# Patient Record
Sex: MALE | Race: WHITE | Employment: FULL TIME | URBAN - METROPOLITAN AREA
[De-identification: names, ages, dates, MRNs, and addresses within clinical notes are randomized per-mention and may not be internally consistent; named-entity substitution may affect disease eponyms.]

---

## 2017-04-20 ENCOUNTER — ONCOLOGY VISIT (OUTPATIENT)
Dept: ONCOLOGY | Facility: CLINIC | Age: 47
End: 2017-04-20
Attending: INTERNAL MEDICINE
Payer: COMMERCIAL

## 2017-04-20 ENCOUNTER — HOSPITAL ENCOUNTER (OUTPATIENT)
Facility: CLINIC | Age: 47
Setting detail: SPECIMEN
Discharge: HOME | End: 2017-04-20
Attending: INTERNAL MEDICINE | Admitting: INTERNAL MEDICINE
Payer: COMMERCIAL

## 2017-04-20 VITALS
TEMPERATURE: 97.6 F | OXYGEN SATURATION: 96 % | HEART RATE: 64 BPM | SYSTOLIC BLOOD PRESSURE: 141 MMHG | BODY MASS INDEX: 44.06 KG/M2 | WEIGHT: 315 LBS | RESPIRATION RATE: 17 BRPM | DIASTOLIC BLOOD PRESSURE: 89 MMHG

## 2017-04-20 DIAGNOSIS — C21.1 MALIGNANT NEOPLASM OF ANAL CANAL (H): Primary | ICD-10-CM

## 2017-04-20 LAB
ALBUMIN SERPL-MCNC: 3.9 G/DL (ref 3.4–5)
ALP SERPL-CCNC: 75 U/L (ref 40–150)
ALT SERPL W P-5'-P-CCNC: 62 U/L (ref 0–70)
ANION GAP SERPL CALCULATED.3IONS-SCNC: 8 MMOL/L (ref 3–14)
AST SERPL W P-5'-P-CCNC: 47 U/L (ref 0–45)
BASOPHILS # BLD AUTO: 0.1 10E9/L (ref 0–0.2)
BASOPHILS NFR BLD AUTO: 1 %
BILIRUB SERPL-MCNC: 0.8 MG/DL (ref 0.2–1.3)
BUN SERPL-MCNC: 9 MG/DL (ref 7–30)
CALCIUM SERPL-MCNC: 7.9 MG/DL (ref 8.5–10.1)
CEA SERPL-MCNC: NORMAL UG/L (ref 0–2.5)
CHLORIDE SERPL-SCNC: 106 MMOL/L (ref 94–109)
CO2 SERPL-SCNC: 26 MMOL/L (ref 20–32)
CREAT SERPL-MCNC: 0.8 MG/DL (ref 0.66–1.25)
DIFFERENTIAL METHOD BLD: NORMAL
EOSINOPHIL # BLD AUTO: 0.2 10E9/L (ref 0–0.7)
EOSINOPHIL NFR BLD AUTO: 3.1 %
ERYTHROCYTE [DISTWIDTH] IN BLOOD BY AUTOMATED COUNT: 13.4 % (ref 10–15)
GFR SERPL CREATININE-BSD FRML MDRD: ABNORMAL ML/MIN/1.7M2
GLUCOSE SERPL-MCNC: 98 MG/DL (ref 70–99)
HCT VFR BLD AUTO: 44.7 % (ref 40–53)
HGB BLD-MCNC: 15.3 G/DL (ref 13.3–17.7)
IMM GRANULOCYTES # BLD: 0 10E9/L (ref 0–0.4)
IMM GRANULOCYTES NFR BLD: 0.3 %
LYMPHOCYTES # BLD AUTO: 1.2 10E9/L (ref 0.8–5.3)
LYMPHOCYTES NFR BLD AUTO: 17.1 %
MCH RBC QN AUTO: 31 PG (ref 26.5–33)
MCHC RBC AUTO-ENTMCNC: 34.2 G/DL (ref 31.5–36.5)
MCV RBC AUTO: 91 FL (ref 78–100)
MONOCYTES # BLD AUTO: 0.6 10E9/L (ref 0–1.3)
MONOCYTES NFR BLD AUTO: 8.4 %
NEUTROPHILS # BLD AUTO: 4.8 10E9/L (ref 1.6–8.3)
NEUTROPHILS NFR BLD AUTO: 70.1 %
NRBC # BLD AUTO: 0 10*3/UL
NRBC BLD AUTO-RTO: 0 /100
PLATELET # BLD AUTO: 174 10E9/L (ref 150–450)
POTASSIUM SERPL-SCNC: 3.7 MMOL/L (ref 3.4–5.3)
PROT SERPL-MCNC: 7.3 G/DL (ref 6.8–8.8)
RBC # BLD AUTO: 4.93 10E12/L (ref 4.4–5.9)
SODIUM SERPL-SCNC: 140 MMOL/L (ref 133–144)
WBC # BLD AUTO: 6.8 10E9/L (ref 4–11)

## 2017-04-20 PROCEDURE — 36415 COLL VENOUS BLD VENIPUNCTURE: CPT

## 2017-04-20 PROCEDURE — 82378 CARCINOEMBRYONIC ANTIGEN: CPT | Performed by: INTERNAL MEDICINE

## 2017-04-20 PROCEDURE — 99211 OFF/OP EST MAY X REQ PHY/QHP: CPT

## 2017-04-20 PROCEDURE — 80053 COMPREHEN METABOLIC PANEL: CPT | Performed by: INTERNAL MEDICINE

## 2017-04-20 PROCEDURE — 85025 COMPLETE CBC W/AUTO DIFF WBC: CPT | Performed by: INTERNAL MEDICINE

## 2017-04-20 PROCEDURE — 99214 OFFICE O/P EST MOD 30 MIN: CPT | Performed by: INTERNAL MEDICINE

## 2017-04-20 ASSESSMENT — PAIN SCALES - GENERAL: PAINLEVEL: NO PAIN (0)

## 2017-04-20 NOTE — PATIENT INSTRUCTIONS
-labs today  -will call you with results  -return to clinic in 1 year with labs a few days prior Scheduled  Chelsea BERGERON given to patient

## 2017-04-20 NOTE — PROGRESS NOTES
"Rex Vieyra is a 46 year old male who presents for:  Chief Complaint   Patient presents with     Oncology Clinic Visit     Malignant neoplasm of anal canal        Initial Vitals:  /89  Pulse 64  Temp 97.6  F (36.4  C) (Oral)  Resp 17  Wt (!) 164.2 kg (362 lb)  SpO2 96%  BMI 44.06 kg/m2 Estimated body mass index is 44.06 kg/(m^2) as calculated from the following:    Height as of 11/5/15: 1.93 m (6' 4\").    Weight as of this encounter: 164.2 kg (362 lb).. Body surface area is 2.97 meters squared. BP completed using cuff size: large  No Pain (0) No LMP for male patient. Allergies and medications reviewed.     Medications: Medication refills not needed today.  Pharmacy name entered into [employer]: [facility] [address] [address] [facility] [address] [facility] [address]    Comments: None    6 minutes for nursing intake (face to face time)   Carrie Ferrer CMA        DISCHARGE PLAN:  Next appointments: See patient instruction section  Departure Mode: Ambulatory  Accompanied by: wife  0 minutes for nursing discharge (face to face time)   Carrie Ferrer CMA      "

## 2017-04-20 NOTE — PROGRESS NOTES
[facility]    Hematology/Oncology Established Patient Follow-up Note      Today's Date: 04/20/2017    Reason for Follow-up: History of treated squamous cell carcinoma of the anus, on surveillance      HISTORY OF PRESENT ILLNESS: Rex Vieyra is a 46 year old male, previously seen by Dr. 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 He underwent concurrent chemoradiation using 5-FU and mitomycin, ending in 06/2011. He has been followed without evidence of recurrence so far. There was PET-positivity in the anal canal in the past in 06/2012, demonstrating an SUV of 9, previously 6.7. Biopsies were done in 07/2012 and were negative. Dr. Neri last saw him on 03/29/2013, and she thought he did not need an anal ultrasound in follow-up, as they were very painful in the past, and wanted to see him back in six months, but he didn't see her again.  He last saw Dr. Acevedo back in May 2014, and recommended to have CT scans for surveillance in 6 months, but he never got those done and subsequently got lost to follow-up.    He re-established care here again in November 2015.        INTERIM HISTORY:  Rasheed comes in for follow-up today.  He has not been seen since November 2015.  He says that he has been doing fine.  He denies any new complaints.  He denies rectal pain, abdominal pain, bowel changes, new lumps/bumps.  Of note, his wife, Cari, sees Dr. Acevedo at [facility] for colon cancer.        REVIEW OF SYSTEMS:   14 point ROS was reviewed and is negative other than as noted above in HPI.     HOME MEDICATIONS:  Current Outpatient Prescriptions   Medication Sig Dispense Refill     omeprazole 20 MG tablet Take 1 tablet (20 mg) by mouth daily Take 30-60 minutes before a meal. 90 tablet 3     Furosemide (LASIX) 20 MG tablet Take 20 mg by mouth daily Reported on 4/20/2017       allopurinol (ZYLOPRIM)  300 MG tablet Take 300 mg by mouth daily Reported on 2017       indomethacin (INDOCIN) 50 MG capsule Take 50 mg by mouth as needed Reported on 2017           ALLERGIES:  No Known Allergies      PAST MEDICAL HISTORY:  Past Medical History:   Diagnosis Date     Gastro-oesophageal reflux disease      Genital warts      Gout      Sleep apnea     Uses C-PAP at [facility]     Squamous cell carcinoma (H)     OF ANUS- RESECTED         PAST SURGICAL HISTORY:  Past Surgical History:   Procedure Laterality Date     anal warts excision       BIOPSY RECTUM  10/13/2011    Procedure:BIOPSY RECTUM; Surgeon:JAGJIT NERI; Location:[facility] OR     BIOPSY RECTUM  2012    Procedure: BIOPSY RECTUM;  Rectal Exam Under Anesthesia, Daniel Cut Anal Biopsy;  Surgeon: Jagjit Neri MD;  Location: [facility]     EXAM UNDER ANESTHESIA RECTUM  10/13/2011    Procedure:EXAM UNDER ANESTHESIA RECTUM; Rectal Exam Under Anesthesia with Rectal Biopsy; Surgeon:JAGJIT NERI; Location:[facility] OR     ORTHOPEDIC SURGERY      left elbow pinning         SOCIAL HISTORY:  Social History     Social History     Marital status: Single     Spouse name: Cari significant other -     Number of children: 0     Years of education: N/A     Occupational History           Social History Main Topics     Smoking status: Former Smoker     Packs/day: 1.00     Years: 22.00     Types: Cigarettes     Quit date: 2008     Smokeless tobacco: Never Used     Alcohol use 4.0 oz/week     8 Standard drinks or equivalent per week      Comment: occassionally 2-6 drinks per week     Drug use: No     Sexual activity: Not on file     Other Topics Concern     Not on file     Social History Narrative         FAMILY HISTORY:  Family History   Problem Relation Age of Onset     CANCER Maternal Grandfather       of lung cancer         PHYSICAL EXAM:  Vital signs:  /89  Pulse 64  Temp 97.6  F (36.4  C) (Oral)  Resp 17  Wt (!) 164.2 kg (362 lb)  SpO2 96%   BMI 44.06 kg/m2   ECO  GENERAL/CONSTITUTIONAL: No acute distress. Accompanied by wife.  EYES: No scleral icterus.  LYMPH: No anterior cervical, posterior cervical, supraclavicular, axillary or inguinal adenopathy.   RESPIRATORY: Clear to auscultation bilaterally. No crackles or wheezing.   CARDIOVASCULAR: Regular rate and rhythm without murmurs, gallops, or rubs.  GASTROINTESTINAL: No tenderness. The patient has normal bowel sounds. No guarding.  MUSCULOSKELETAL: Warm and well-perfused.  NEUROLOGIC: Alert, oriented, answers questions appropriately.  INTEGUMENTARY: No jaundice.  GAIT: Steady, does not use assistive device      LABS:  CBC RESULTS:   Recent Labs   Lab Test  17   1515   WBC  6.8   RBC  4.93   HGB  15.3   HCT  44.7   MCV  91   MCH  31.0   MCHC  34.2   RDW  13.4   PLT  174     Recent Labs   Lab Test  17   1515  14   1604   NA  140  141   POTASSIUM  3.7  3.6   CHLORIDE  106  102   CO2  26  24   ANIONGAP  8  14   GLC  98  108*   BUN  9  16   CR  0.80  0.93   KIP  7.9*  8.6     Lab Results   Component Value Date    AST 47 2017     Lab Results   Component Value Date    ALT 62 2017     No results found for: BILICONJ   Lab Results   Component Value Date    BILITOTAL 0.8 2017     Lab Results   Component Value Date    ALBUMIN 3.9 2017     Lab Results   Component Value Date    PROTTOTAL 7.3 2017      Lab Results   Component Value Date    ALKPHOS 75 2017     CEA is pending.      IMAGING:  None recently.    Last CT c/a/p was on 11/06/15:  1. Mildly enlarged bilateral external iliac chain and inguinal lymph  nodes are unchanged since 2014.  2. A 0.4 cm right pulmonary nodule is unchanged for greater than two  years, and is therefore highly likely to be of benign etiology.  3. Diffuse fatty infiltration of the liver.      ASSESSMENT/PLAN:  Rex Vieyra is a 46 year old male:    1) Squamous cell carcinoma of the anus: s/p resection and chemoradiation  completed in June 2011.  He has been on surveillance since then without evidence of recurrence.    -now that he is 5 years out from completion of treatment, will stop doing routine imaging unless he develops concerning symptoms  -will obtain labs, including CEA today  -if labs unremarkable, RTC in 1 year with labs    I spent a total of 25 minutes with the patient, with over >50% of the time in counseling and/or coordination of care.       Chio Rubio MD  Hematology/Oncology  [facility]

## 2017-04-20 NOTE — MR AVS SNAPSHOT
After Visit Summary   4/20/2017    Rex Vieyra    MRN: [id number]           Patient Information     Date Of Birth          1970        Visit Information        Provider Department      4/20/2017 2:45 PM Chio Rubio MD [facility]      Today's Diagnoses     Malignant neoplasm of anal canal    -  1      Care Instructions    -labs today  -will call you with results  -return to clinic in 1 year with labs a few days prior Scheduled  Chelsea ROSARIOS given to patient          Follow-ups after your visit        Your next 10 appointments already scheduled     Apr 16, 2018  8:00 AM CDT   Return Visit with  ONCOLOGY NURSE   [facility] ([facility])    [facility]  [address] [place] [address] [address]  [facility] [id number] [phone]            Apr 19, 2018  3:45 PM CDT   Return Visit with Chio Rubio MD   [facility] ([facility])    [facility]  [address]  [facility] [address] [phone]              Future tests that were ordered for you today     Open Future Orders        Priority Expected Expires Ordered    Comprehensive metabolic panel Routine 4/20/2018 4/20/2018 4/20/2017    CEA Routine 4/20/2018 4/20/2018 4/20/2017    CBC with platelets differential Routine 4/20/2018 4/20/2018 4/20/2017            Who to contact     If you have questions or need follow up information about today's clinic visit or your schedule please contact [facility] directly at [phone].  Normal or non-critical lab and imaging results will be communicated to you by MyChart, letter or phone within 4 business days after the clinic has received the results. If you do not hear from us within 7 days, please contact the clinic through MyChart or phone. If you have a critical or abnormal lab result, we  "will notify you by phone as soon as possible.  Submit refill requests through [employer] or call your pharmacy and they will forward the refill request to us. Please allow 3 business days for your refill to be completed.          Additional Information About Your Visit        [facility] [employer] Information     [employer] lets you send messages to your doctor, view your test results, renew your prescriptions, schedule appointments and more. To sign up, go to [email or web address] . Click on \"Log in\" on the left side of the screen, which will take you to the Welcome page. Then click on \"Sign up Now\" on the right side of the page.     You will be asked to enter the access code listed below, as well as some personal information. Please follow the directions to create your username and password.     Your access code is: [email or web address]  Expires: 2017  3:22 PM     Your access code will  in 90 days. If you need help or a new code, please call your [place] clinic or [phone].        Care EveryWhere ID     This is your Care EveryWhere ID. This could be used by other organizations to access your [place] medical records  [id number]        Your Vitals Were     Pulse Temperature Respirations Pulse Oximetry BMI (Body Mass Index)       64 97.6  F (36.4  C) (Oral) 17 96% 44.06 kg/m2        Blood Pressure from Last 3 Encounters:   17 141/89   11/05/15 140/81   14 140/82    Weight from Last 3 Encounters:   17 (!) 164.2 kg (362 lb)   11/05/15 (!) 152.9 kg (337 lb)   14 (!) 153.3 kg (338 lb)              We Performed the Following     CBC with platelets differential     CEA     Comprehensive metabolic panel        Primary Care Provider Office Phone # Fax #    Lizz Acevedo [phone]       [address] [address]        Thank you!     Thank you for choosing [facility]  for your care. Our goal is always to provide you with " excellent care. Hearing back from our patients is one way we can continue to improve our services. Please take a few minutes to complete the written survey that you may receive in the mail after your visit with us. Thank you!             Your Updated Medication List - Protect others around you: Learn how to safely use, store and throw away your medicines at www.disposemymeds.org.          This list is accurate as of: 4/20/17  3:22 PM.  Always use your most recent med list.                   Brand Name Dispense Instructions for use    allopurinol 300 MG tablet    ZYLOPRIM     Take 300 mg by mouth daily Reported on 4/20/2017       furosemide 20 MG tablet    LASIX     Take 20 mg by mouth daily Reported on 4/20/2017       indomethacin 50 MG capsule    INDOCIN     Take 50 mg by mouth as needed Reported on 4/20/2017       omeprazole 20 MG tablet     90 tablet    Take 1 tablet (20 mg) by mouth daily Take 30-60 minutes before a meal.

## 2017-04-28 ENCOUNTER — TELEPHONE (OUTPATIENT)
Dept: ONCOLOGY | Facility: CLINIC | Age: 47
End: 2017-04-28

## 2018-05-17 ENCOUNTER — ONCOLOGY VISIT (OUTPATIENT)
Dept: ONCOLOGY | Facility: CLINIC | Age: 48
End: 2018-05-17
Attending: INTERNAL MEDICINE
Payer: COMMERCIAL

## 2018-05-17 ENCOUNTER — HOSPITAL ENCOUNTER (OUTPATIENT)
Facility: CLINIC | Age: 48
Setting detail: SPECIMEN
Discharge: HOME | End: 2018-05-17
Attending: INTERNAL MEDICINE | Admitting: INTERNAL MEDICINE
Payer: COMMERCIAL

## 2018-05-17 VITALS
WEIGHT: 315 LBS | BODY MASS INDEX: 38.36 KG/M2 | HEART RATE: 70 BPM | TEMPERATURE: 99.2 F | OXYGEN SATURATION: 92 % | HEIGHT: 76 IN | RESPIRATION RATE: 18 BRPM | DIASTOLIC BLOOD PRESSURE: 98 MMHG | SYSTOLIC BLOOD PRESSURE: 167 MMHG

## 2018-05-17 DIAGNOSIS — N60.81: Primary | ICD-10-CM

## 2018-05-17 DIAGNOSIS — C21.1 MALIGNANT NEOPLASM OF ANAL CANAL (H): ICD-10-CM

## 2018-05-17 LAB
ALBUMIN SERPL-MCNC: 3.9 G/DL (ref 3.4–5)
ALP SERPL-CCNC: 91 U/L (ref 40–150)
ALT SERPL W P-5'-P-CCNC: 86 U/L (ref 0–70)
ANION GAP SERPL CALCULATED.3IONS-SCNC: 6 MMOL/L (ref 3–14)
AST SERPL W P-5'-P-CCNC: 60 U/L (ref 0–45)
BASOPHILS # BLD AUTO: 0.1 10E9/L (ref 0–0.2)
BASOPHILS NFR BLD AUTO: 1.1 %
BILIRUB SERPL-MCNC: 0.8 MG/DL (ref 0.2–1.3)
BUN SERPL-MCNC: 14 MG/DL (ref 7–30)
CALCIUM SERPL-MCNC: 8.4 MG/DL (ref 8.5–10.1)
CEA SERPL-MCNC: <0.5 UG/L (ref 0–2.5)
CHLORIDE SERPL-SCNC: 105 MMOL/L (ref 94–109)
CO2 SERPL-SCNC: 28 MMOL/L (ref 20–32)
CREAT SERPL-MCNC: 0.82 MG/DL (ref 0.66–1.25)
DIFFERENTIAL METHOD BLD: NORMAL
EOSINOPHIL # BLD AUTO: 0.2 10E9/L (ref 0–0.7)
EOSINOPHIL NFR BLD AUTO: 2.7 %
ERYTHROCYTE [DISTWIDTH] IN BLOOD BY AUTOMATED COUNT: 13.2 % (ref 10–15)
GFR SERPL CREATININE-BSD FRML MDRD: >90 ML/MIN/1.7M2
GLUCOSE SERPL-MCNC: 101 MG/DL (ref 70–99)
HCT VFR BLD AUTO: 46 % (ref 40–53)
HGB BLD-MCNC: 15.7 G/DL (ref 13.3–17.7)
IMM GRANULOCYTES # BLD: 0 10E9/L (ref 0–0.4)
IMM GRANULOCYTES NFR BLD: 0.3 %
LYMPHOCYTES # BLD AUTO: 1.5 10E9/L (ref 0.8–5.3)
LYMPHOCYTES NFR BLD AUTO: 19.3 %
MCH RBC QN AUTO: 31.5 PG (ref 26.5–33)
MCHC RBC AUTO-ENTMCNC: 34.1 G/DL (ref 31.5–36.5)
MCV RBC AUTO: 92 FL (ref 78–100)
MONOCYTES # BLD AUTO: 0.7 10E9/L (ref 0–1.3)
MONOCYTES NFR BLD AUTO: 9 %
NEUTROPHILS # BLD AUTO: 5.1 10E9/L (ref 1.6–8.3)
NEUTROPHILS NFR BLD AUTO: 67.6 %
NRBC # BLD AUTO: 0 10*3/UL
NRBC BLD AUTO-RTO: 0 /100
PLATELET # BLD AUTO: 179 10E9/L (ref 150–450)
POTASSIUM SERPL-SCNC: 3.6 MMOL/L (ref 3.4–5.3)
PROT SERPL-MCNC: 7.7 G/DL (ref 6.8–8.8)
RBC # BLD AUTO: 4.98 10E12/L (ref 4.4–5.9)
SODIUM SERPL-SCNC: 139 MMOL/L (ref 133–144)
WBC # BLD AUTO: 7.5 10E9/L (ref 4–11)

## 2018-05-17 PROCEDURE — 82378 CARCINOEMBRYONIC ANTIGEN: CPT | Performed by: INTERNAL MEDICINE

## 2018-05-17 PROCEDURE — 85025 COMPLETE CBC W/AUTO DIFF WBC: CPT | Performed by: INTERNAL MEDICINE

## 2018-05-17 PROCEDURE — 99214 OFFICE O/P EST MOD 30 MIN: CPT | Performed by: INTERNAL MEDICINE

## 2018-05-17 PROCEDURE — G0463 HOSPITAL OUTPT CLINIC VISIT: HCPCS

## 2018-05-17 PROCEDURE — 80053 COMPREHEN METABOLIC PANEL: CPT | Performed by: INTERNAL MEDICINE

## 2018-05-17 ASSESSMENT — PAIN SCALES - GENERAL: PAINLEVEL: NO PAIN (0)

## 2018-05-17 NOTE — PATIENT INSTRUCTIONS
-schedule right sided mammogram and ultrasound next week  -return to clinic in 1 year with labs all scheduled [place]

## 2018-05-17 NOTE — PROGRESS NOTES
[facility]    Hematology/Oncology Established Patient Follow-up Note      Today's Date: 05/17/2018    Reason for Follow-up: History of treated squamous cell carcinoma of the anus, on surveillance      HISTORY OF PRESENT ILLNESS: Rex Vieyra is a 47 year old male, previously seen by Dr. 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 He underwent concurrent chemoradiation using 5-FU and mitomycin, ending in 06/2011. He has been followed without evidence of recurrence so far. There was PET-positivity in the anal canal in the past in 06/2012, demonstrating an SUV of 9, previously 6.7. Biopsies were done in 07/2012 and were negative. Dr. Neri last saw him on 03/29/2013, and she thought he did not need an anal ultrasound in follow-up, as they were very painful in the past, and wanted to see him back in six months, but he didn't see her again.  He last saw Dr. Acevedo back in May 2014, and recommended to have CT scans for surveillance in 6 months, but he never got those done and subsequently got lost to follow-up.    He re-established care here again in November 2015.      Of note, his wife, Cari, sees Dr. Acevedo at [facility] for colon cancer.        INTERIM HISTORY:  Rasheed comes in for follow-up today.  He says that he has been feeling fine.  He denies rectal bleeding or pain.  Bowel movements are normal.  He notes that his wife noted that his right breast feels abnormal.  There is not a lump or mass there, but it felt firmer.  He denies pain.        REVIEW OF SYSTEMS:   14 point ROS was reviewed and is negative other than as noted above in HPI.     HOME MEDICATIONS:  Current Outpatient Prescriptions   Medication Sig Dispense Refill     allopurinol (ZYLOPRIM) 300 MG tablet Take 300 mg by mouth daily Reported on 4/20/2017       omeprazole 20 MG tablet Take 1 tablet (20 mg) by mouth daily  Take 30-60 minutes before a meal. 90 tablet 3     Furosemide (LASIX) 20 MG tablet Take 20 mg by mouth daily Reported on 2017       indomethacin (INDOCIN) 50 MG capsule Take 50 mg by mouth as needed Reported on 2017           ALLERGIES:  No Known Allergies      PAST MEDICAL HISTORY:  Past Medical History:   Diagnosis Date     Gastro-oesophageal reflux disease      Genital warts 2009     Gout      Sleep apnea     U[facility]     Squamous cell carcinoma     OF ANUS- RESECTED         PAST SURGICAL HISTORY:  Past Surgical History:   Procedure Laterality Date     anal warts excision       BIOPSY RECTUM  10/13/2011    Procedure:BIOPSY RECTUM; Surgeon:JAGJIT NERI; Location:[facility]     BIOPSY RECTUM  2012    Procedure: BIOPSY RECTUM;  Rectal Exam Under Anesthesia, Daniel Cut Anal Biopsy;  Surgeon: Jagjit Neri MD;  Location: [facility]     EXAM UNDER ANESTHESIA RECTUM  10/13/2011    Procedure:EXAM UNDER ANESTHESIA RECTUM; Rectal Exam Under Anesthesia with Rectal Biopsy; Surgeon:JAGJIT NERI; Location:[facility] OR     ORTHOPEDIC SURGERY      left elbow pinning         SOCIAL HISTORY:  Social History     Social History     Marital status: Single     Spouse name: Cari significant other -     Number of children: 0     Years of education: N/A     Occupational History           Social History Main Topics     Smoking status: Former Smoker     Packs/day: 1.00     Years: 22.00     Types: Cigarettes     Quit date: 2008     Smokeless tobacco: Never Used     Alcohol use 4.0 oz/week     8 Standard drinks or equivalent per week      Comment: occassionally 2-6 drinks per week     Drug use: No     Sexual activity: Not on file     Other Topics Concern     Not on file     Social History Narrative         FAMILY HISTORY:  Family History   Problem Relation Age of Onset     CANCER Maternal Grandfather       of lung cancer         PHYSICAL EXAM:  Vital signs:  BP (!) 167/98  Pulse 70  Temp 99.2  F  "(37.3  C) (Tympanic)  Resp 18  Ht 1.93 m (6' 4\")  Wt (!) 163.3 kg (360 lb)  SpO2 92%  BMI 43.82 kg/m2   ECO  GENERAL/CONSTITUTIONAL: No acute distress.   EYES: No scleral icterus.  LYMPH: No anterior cervical, posterior cervical, supraclavicular, axillary or inguinal adenopathy.   RESPIRATORY: Clear to auscultation bilaterally. No crackles or wheezing.   CARDIOVASCULAR: Regular rate and rhythm without murmurs, gallops, or rubs.  GASTROINTESTINAL: No tenderness. The patient has normal bowel sounds. No guarding.  BREAST: Right-no palpable mass, but the breast tissue does feel firmer at the lower outer quadrant compared to the other breast.  Left-no palpable mass, discharge, rash, or axillary lymphadenopathy.   MUSCULOSKELETAL: Warm and well-perfused.  NEUROLOGIC: Alert, oriented, answers questions appropriately.  INTEGUMENTARY: No jaundice.  GAIT: Steady, does not use assistive device      LABS:  CBC RESULTS:   Recent Labs   Lab Test  18   1525   WBC  7.5   RBC  4.98   HGB  15.7   HCT  46.0   MCV  92   MCH  31.5   MCHC  34.1   RDW  13.2   PLT  179     Recent Labs   Lab Test  18   1525  17   1515   NA  139  140   POTASSIUM  3.6  3.7   CHLORIDE  105  106   CO2  28  26   ANIONGAP  6  8   GLC  101*  98   BUN  14  9   CR  0.82  0.80   KIP  8.4*  7.9*     Lab Results   Component Value Date    AST 60 2018     Lab Results   Component Value Date    ALT 86 2018     No results found for: BILICONJ   Lab Results   Component Value Date    BILITOTAL 0.8 2018     Lab Results   Component Value Date    ALBUMIN 3.9 2018     Lab Results   Component Value Date    PROTTOTAL 7.7 2018      Lab Results   Component Value Date    ALKPHOS 91 2018         IMAGING:  None recently.    Last CT c/a/p was on 11/06/15:  1. Mildly enlarged bilateral external iliac chain and inguinal lymph  nodes are unchanged since 2014.  2. A 0.4 cm right pulmonary nodule is unchanged for greater than " two  years, and is therefore highly likely to be of benign etiology.  3. Diffuse fatty infiltration of the liver.      ASSESSMENT/PLAN:  Rex Vieyra is a 47 year old male:    1) Squamous cell carcinoma of the anus: s/p resection and chemoradiation completed in June 2011.  He has been on surveillance since then without evidence of recurrence.    -now that he is 5 years out from completion of treatment, will stop doing routine imaging unless he develops concerning symptoms  -return to clinic in 1 year with labs    2) Abnormal breast exam: Right breast at the lower outer quadrant feels firmer, but there is no discrete lump or mass there.  It is not painful or tender.  -will send for diagnostic mammogram and ultrasound    3) Transaminitis: mild, and it has been elevated in the past.  Past imaging has shown diffuse fatty infiltration of the liver, and that is likely the cause.  If he has abdominal pain, he is to call us.    -monitor for now      I spent a total of 25 minutes with the patient, with over >50% of the time in counseling and/or coordination of care.       Chio Rubio MD  Hematology/Oncology  [facility]

## 2018-05-17 NOTE — NURSING NOTE
"Oncology Rooming Note    May 17, 2018 3:31 PM   Rex Vieyra is a 47 year old male who presents for:    Chief Complaint   Patient presents with     Oncology Clinic Visit     Follow up - Malignant neoplasm of anal canal     Initial Vitals: BP (!) 167/98  Pulse 70  Temp 99.2  F (37.3  C) (Tympanic)  Resp 18  Ht 1.93 m (6' 4\")  Wt (!) 163.3 kg (360 lb)  SpO2 92%  BMI 43.82 kg/m2 Estimated body mass index is 43.82 kg/(m^2) as calculated from the following:    Height as of this encounter: 1.93 m (6' 4\").    Weight as of this encounter: 163.3 kg (360 lb). Body surface area is 2.96 meters squared.  No Pain (0) Comment: Data Unavailable   No LMP for male patient.  Allergies reviewed: Yes  Medications reviewed: Yes    Medications: Medication refills not needed today.  Pharmacy name entered into [facility]:    [facility] [address] [facility] [address] [facility] [address] [place]  [facility] [address] [facility] [address] AT [facility] OF [address]    Clinical concerns: Follow up   8 minutes for nursing intake (face to face time)     Ju Shaw CMA     DISCHARGE PLAN:  Next appointments: See patient instruction section  Departure Mode: Ambulatory  Accompanied by: self  0 minutes for nursing discharge (face to face time)   Ju Shaw CMA                  "

## 2018-05-17 NOTE — MR AVS SNAPSHOT
"              After Visit Summary   5/17/2018    Rex Vieyra    MRN: [id number]           Patient Information     Date Of Birth          1970        Visit Information        Provider Department      5/17/2018 3:45 PM Chio Rubio MD [facility]  Oncology [facility]      Today's Diagnoses     Abnormal breast tissue, right    -  1    Malignant neoplasm of anal canal          Care Instructions    -schedule right sided mammogram and ultrasound next week  -return to clinic in 1 year with labs all scheduled [place]          Follow-ups after your visit        Your next 10 appointments already scheduled     May 21, 2018  2:00 PM CDT   MA DIAGNOSTIC BILATERAL W/ CANDI with [id number]   [facility] ([facility])    [address], [address]  [facility] [address] [phone]           Do not use any powder, lotion or deodorant under your arms or on your breast. If you do, we will ask you to remove it before your exam.  Wear comfortable, two-piece clothing.  If you have any allergies, tell your care team.  Bring any previous mammograms from other facilities or have them mailed to the breast center.  Three-dimensional (3D) mammograms are available at [facility] locations in [facility], [place], [facility], [facility], [place], [place], and [place]. [facility] locations include [place] and [facility] in [place]. Benefits of 3D mammograms include: - Improved rate of cancer detection - Decreases your chance of having to go back for more tests, which means fewer: - \"False-positive\" results (This means that there is an abnormal area but it isn't cancer.) - Invasive testing procedures, such as a biopsy or surgery - Can provide clearer images of the breast if you have dense breast tissue. 3D mammography is an optional exam that anyone can have with a 2D mammogram. It doesn't replace or take the place of a 2D mammogram. 2D " mammograms remain an effective screening test for all women.  Not all insurance companies cover the cost of a 3D mammogram. Check with your insurance.            May 21, 2018  2:30 PM CDT   US BREAST RIGHT LIMITED 1-3 QUAD with [email or web address]   [facility] ([facility])    [address], [address]  [facility] [id number] [phone]           Please bring a list of your medicines (including vitamins, minerals and over-the-counter drugs). Also, tell your doctor about any allergies you may have. Wear comfortable clothes and leave your valuables at home.  You do not need to do anything special to prepare for your exam.  Please call the Imaging Department at your exam site with any questions.            May 16, 2019  3:15 PM CDT   Return Visit with Chio Rubio MD   [facility] ([facility])    [facility]  [address]  [facility] [id number] [phone]              Future tests that were ordered for you today     Open Future Orders        Priority Expected Expires Ordered    CEA Routine 5/17/2019 5/17/2019 5/17/2018    CBC with platelets differential Routine 5/17/2019 5/17/2019 5/17/2018    Comprehensive metabolic panel Routine 5/17/2019 5/17/2019 5/17/2018    US Breast Right Limited 1-3 Quadrants Routine  5/17/2019 5/17/2018    MA Diagnostic Bilateral w/Adolfo Routine  5/17/2019 5/17/2018            Who to contact     If you have questions or need follow up information about today's clinic visit or your schedule please contact [facility] directly at [phone].  Normal or non-critical lab and imaging results will be communicated to you by MyChart, letter or phone within 4 business days after the clinic has received the results. If you do not hear from us within 7 days, please contact the clinic through MyChart or phone. If you have a critical or abnormal lab result,  "we will notify you by phone as soon as possible.  Submit refill requests through [employer] or call your pharmacy and they will forward the refill request to us. Please allow 3 business days for your refill to be completed.          Additional Information About Your Visit        [employer] Information     [employer] lets you send messages to your doctor, view your test results, renew your prescriptions, schedule appointments and more. To sign up, go to [email or web address] . Click on \"Log in\" on the left side of the screen, which will take you to the Welcome page. Then click on \"Sign up Now\" on the right side of the page.     You will be asked to enter the access code listed below, as well as some personal information. Please follow the directions to create your username and password.     Your access code is: [email or web address]  Expires: 8/15/2018  4:17 PM     Your access code will  in 90 days. If you need help or a new code, please call your [facility] clinic or [phone].        Care EveryWhere ID     This is your Care EveryWhere ID. This could be used by other organizations to access your [facility] medical records  [id number]        Your Vitals Were     Pulse Temperature Respirations Height Pulse Oximetry BMI (Body Mass Index)    70 99.2  F (37.3  C) (Tympanic) 18 1.93 m (6' 4\") 92% 43.82 kg/m2       Blood Pressure from Last 3 Encounters:   18 (!) 167/98   17 141/89   11/05/15 140/81    Weight from Last 3 Encounters:   18 (!) 163.3 kg (360 lb)   17 (!) 164.2 kg (362 lb)   11/05/15 (!) 152.9 kg (337 lb)              We Performed the Following     CBC with platelets differential     CEA     Comprehensive metabolic panel        Primary Care Provider Office Phone # Fax #    Lizz Acevedo [phone]       [address] [address]        Equal Access to Services     CAMERON BOSTON AH: eric Broussard qaybta " leni rosenthalwindy carrillo ah. So [facility] [phone].    ATENCIÓN: Si brigette rosa, tiene a donato disposición servicios gratuitos de asistencia lingüística. Cristal al [phone].    We comply with applicable federal civil rights laws and [place] laws. We do not discriminate on the basis of race, color, national origin, age, disability, sex, sexual orientation, or gender identity.            Thank you!     Thank you for choosing [facility]  for your care. Our goal is always to provide you with excellent care. Hearing back from our patients is one way we can continue to improve our services. Please take a few minutes to complete the written survey that you may receive in the mail after your visit with us. Thank you!             Your Updated Medication List - Protect others around you: Learn how to safely use, store and throw away your medicines at www.disposemymeds.org.          This list is accurate as of 5/17/18  4:17 PM.  Always use your most recent med list.                   Brand Name Dispense Instructions for use Diagnosis    allopurinol 300 MG tablet    ZYLOPRIM     Take 300 mg by mouth daily Reported on 4/20/2017        furosemide 20 MG tablet    LASIX     Take 20 mg by mouth daily Reported on 4/20/2017    Malignant neoplasm of anal canal (H)       indomethacin 50 MG capsule    INDOCIN     Take 50 mg by mouth as needed Reported on 4/20/2017        omeprazole 20 MG tablet     90 tablet    Take 1 tablet (20 mg) by mouth daily Take 30-60 minutes before a meal.    Malignant neoplasm of anal canal (H)

## 2018-05-21 ENCOUNTER — HOSPITAL ENCOUNTER (OUTPATIENT)
Dept: MAMMOGRAPHY | Facility: CLINIC | Age: 48
Discharge: HOME | End: 2018-05-21
Attending: INTERNAL MEDICINE | Admitting: INTERNAL MEDICINE
Payer: COMMERCIAL

## 2018-05-21 DIAGNOSIS — N60.81: ICD-10-CM

## 2018-05-21 PROCEDURE — 77066 DX MAMMO INCL CAD BI: CPT

## 2019-05-16 ENCOUNTER — HOSPITAL ENCOUNTER (OUTPATIENT)
Facility: CLINIC | Age: 49
Setting detail: SPECIMEN
End: 2019-05-16
Attending: INTERNAL MEDICINE
Payer: COMMERCIAL

## 2019-06-17 PROBLEM — N60.89: Status: ACTIVE | Noted: 2018-05-17

## 2020-01-21 ENCOUNTER — TRANSFERRED RECORDS (OUTPATIENT)
Dept: HEALTH INFORMATION MANAGEMENT | Facility: CLINIC | Age: 50
End: 2020-01-21

## 2020-01-22 ENCOUNTER — MEDICAL CORRESPONDENCE (OUTPATIENT)
Dept: HEALTH INFORMATION MANAGEMENT | Facility: CLINIC | Age: 50
End: 2020-01-22

## 2020-01-22 ENCOUNTER — TRANSFERRED RECORDS (OUTPATIENT)
Dept: HEALTH INFORMATION MANAGEMENT | Facility: CLINIC | Age: 50
End: 2020-01-22

## 2020-01-22 DIAGNOSIS — R20.2 FACIAL PARESTHESIA: ICD-10-CM

## 2020-01-22 DIAGNOSIS — R20.2 PARESTHESIA OF LEFT ARM: ICD-10-CM

## 2020-01-22 DIAGNOSIS — R94.31 PROLONGED Q-T INTERVAL ON ECG: Primary | ICD-10-CM

## 2020-01-23 ENCOUNTER — HOSPITAL ENCOUNTER (OUTPATIENT)
Dept: ULTRASOUND IMAGING | Facility: CLINIC | Age: 50
Discharge: HOME | End: 2020-01-23
Attending: FAMILY MEDICINE | Admitting: FAMILY MEDICINE
Payer: COMMERCIAL

## 2020-01-23 DIAGNOSIS — R20.2 PARESTHESIA OF LEFT ARM: ICD-10-CM

## 2020-01-23 DIAGNOSIS — R94.31 PROLONGED Q-T INTERVAL ON ECG: ICD-10-CM

## 2020-01-23 DIAGNOSIS — R20.2 FACIAL PARESTHESIA: ICD-10-CM

## 2020-01-23 PROCEDURE — 93880 EXTRACRANIAL BILAT STUDY: CPT

## 2020-01-23 PROCEDURE — 93880 EXTRACRANIAL BILAT STUDY: CPT | Mod: 26 | Performed by: INTERNAL MEDICINE

## 2020-01-24 ENCOUNTER — HOSPITAL ENCOUNTER (OUTPATIENT)
Dept: CARDIOLOGY | Facility: CLINIC | Age: 50
Discharge: HOME | End: 2020-01-24
Attending: FAMILY MEDICINE | Admitting: FAMILY MEDICINE
Payer: COMMERCIAL

## 2020-01-24 ENCOUNTER — PRE VISIT (OUTPATIENT)
Dept: CARDIOLOGY | Facility: CLINIC | Age: 50
End: 2020-01-24

## 2020-01-24 DIAGNOSIS — R94.31 PROLONGED Q-T INTERVAL ON ECG: ICD-10-CM

## 2020-01-24 DIAGNOSIS — R20.2 FACIAL PARESTHESIA: ICD-10-CM

## 2020-01-24 DIAGNOSIS — R20.2 PARESTHESIA OF LEFT ARM: ICD-10-CM

## 2020-01-24 PROCEDURE — 40000264 ECHO STRESS ECHOCARDIOGRAM

## 2020-01-24 PROCEDURE — 25500064 ZZH RX 255 OP 636: Performed by: INTERNAL MEDICINE

## 2020-01-24 PROCEDURE — 93325 DOPPLER ECHO COLOR FLOW MAPG: CPT | Mod: 26 | Performed by: INTERNAL MEDICINE

## 2020-01-24 PROCEDURE — 93321 DOPPLER ECHO F-UP/LMTD STD: CPT | Mod: 26 | Performed by: INTERNAL MEDICINE

## 2020-01-24 PROCEDURE — 93350 STRESS TTE ONLY: CPT | Mod: 26 | Performed by: INTERNAL MEDICINE

## 2020-01-24 PROCEDURE — 93016 CV STRESS TEST SUPVJ ONLY: CPT | Performed by: INTERNAL MEDICINE

## 2020-01-24 PROCEDURE — 93018 CV STRESS TEST I&R ONLY: CPT | Performed by: INTERNAL MEDICINE

## 2020-01-24 RX ADMIN — HUMAN ALBUMIN MICROSPHERES AND PERFLUTREN 3 ML: 10; .22 INJECTION, SOLUTION INTRAVENOUS at 14:00

## 2025-08-23 ENCOUNTER — OFFICE VISIT (OUTPATIENT)
Dept: URGENT CARE | Facility: URGENT CARE | Age: 55
End: 2025-08-23
Payer: COMMERCIAL

## 2025-08-23 ENCOUNTER — HOSPITAL ENCOUNTER (EMERGENCY)
Facility: CLINIC | Age: 55
Discharge: HOME | End: 2025-08-23
Payer: COMMERCIAL

## 2025-08-23 VITALS
HEART RATE: 86 BPM | WEIGHT: 266 LBS | DIASTOLIC BLOOD PRESSURE: 96 MMHG | BODY MASS INDEX: 32.38 KG/M2 | TEMPERATURE: 97.5 F | OXYGEN SATURATION: 99 % | SYSTOLIC BLOOD PRESSURE: 152 MMHG | RESPIRATION RATE: 20 BRPM

## 2025-08-23 DIAGNOSIS — R10.11 RUQ ABDOMINAL PAIN: ICD-10-CM

## 2025-08-23 DIAGNOSIS — R10.31 RLQ ABDOMINAL PAIN: Primary | ICD-10-CM

## 2025-08-23 PROCEDURE — 3077F SYST BP >= 140 MM HG: CPT | Performed by: NURSE PRACTITIONER

## 2025-08-23 PROCEDURE — 99283 EMERGENCY DEPT VISIT LOW MDM: CPT

## 2025-08-23 PROCEDURE — 99205 OFFICE O/P NEW HI 60 MIN: CPT | Performed by: NURSE PRACTITIONER

## 2025-08-23 PROCEDURE — 3080F DIAST BP >= 90 MM HG: CPT | Performed by: NURSE PRACTITIONER

## 2025-08-23 RX ORDER — LISINOPRIL 40 MG/1
TABLET ORAL
COMMUNITY
Start: 2025-08-07

## 2025-08-23 RX ORDER — LEVOTHYROXINE SODIUM 125 UG/1
125 TABLET ORAL DAILY
COMMUNITY

## 2025-08-23 RX ORDER — TIRZEPATIDE 15 MG/.5ML
INJECTION, SOLUTION SUBCUTANEOUS
COMMUNITY
Start: 2025-08-19

## 2025-08-23 RX ORDER — AMLODIPINE BESYLATE 5 MG/1
TABLET ORAL
COMMUNITY
Start: 2025-03-24

## 2025-08-23 RX ORDER — POTASSIUM CHLORIDE 750 MG/1
TABLET, EXTENDED RELEASE ORAL
COMMUNITY
Start: 2024-08-24